# Patient Record
Sex: MALE | Race: WHITE
[De-identification: names, ages, dates, MRNs, and addresses within clinical notes are randomized per-mention and may not be internally consistent; named-entity substitution may affect disease eponyms.]

---

## 2018-06-15 ENCOUNTER — HOSPITAL ENCOUNTER (EMERGENCY)
Dept: HOSPITAL 17 - PHED | Age: 38
Discharge: HOME | End: 2018-06-15
Payer: COMMERCIAL

## 2018-06-15 VITALS
HEART RATE: 77 BPM | SYSTOLIC BLOOD PRESSURE: 144 MMHG | DIASTOLIC BLOOD PRESSURE: 77 MMHG | RESPIRATION RATE: 16 BRPM | OXYGEN SATURATION: 99 % | TEMPERATURE: 98.2 F

## 2018-06-15 DIAGNOSIS — F98.8: ICD-10-CM

## 2018-06-15 DIAGNOSIS — H92.02: ICD-10-CM

## 2018-06-15 DIAGNOSIS — J02.9: Primary | ICD-10-CM

## 2018-06-15 DIAGNOSIS — Z79.899: ICD-10-CM

## 2018-06-15 PROCEDURE — 99283 EMERGENCY DEPT VISIT LOW MDM: CPT

## 2018-06-15 NOTE — PD
HPI


.


Sore throat


Chief Complaint:  ENT Complaint


Time Seen by Provider:  08:28


Travel History


International Travel<30 days:  No


Contact w/Intl Traveler<30days:  No


Traveled to known affect area:  No





History of Present Illness


HPI


Patient presents with a chief complaint of a sore throat and left ear pain.  

Onset yesterday.  Symptoms have been persistent since that time.  Pain is rated 

6/10.  No known fever.  He reports a positive exposure to a respiratory 

infection at work.





Formerly [facility]


Past Medical History


ADD:  Yes


Diminished Hearing:  No





Past Surgical History


Ear Surgery:  Yes ( TUBES AS A CHILD)





Social History


Alcohol Use:  No


Tobacco Use:  Yes (1PPD)


Substance Use:  No





Allergies-Medications


(Allergen,Severity, Reaction):  


Coded Allergies:  


     penicillin G (Verified  Allergy, Severe, anaphalaytic, 6/15/18)


Reported Meds & Prescriptions





Reported Meds & Active Scripts


Active


Keflex (Cephalexin) 500 Mg Capsule 500 Mg PO TID 7 Days


Deltasone 20 Mg Tab (Prednisone) 20 Mg Tab 20 Mg PO BID 5 Days


Benzonatate 200 Mg Cap 200 Mg PO Q8H PRN


Ventolin Hfa (Albuterol Sulfate) 8 Gm Aero 1 Puff INH Q4H PRN


     * SHAKE WELL BEFORE USE *








Review of Systems


Except as stated in HPI:  all other systems reviewed are Neg


General / Constitutional:  No: Fever, Chills


HENT:  Positive: Sore Throat, Earache





Physical Exam


Narrative


GENERAL: Awake and alert and in no acute distress.


SKIN: Warm and dry.  Normal color and turgor.


HEAD: Normocephalic/atraumatic.


EYES: Pupils are equal.  Extraocular movements are intact.


ENT: Oropharynx has erythema and edema of the uvula.  The tonsils are not 

enlarged and there is no exudate.  There is no peritonsillar swelling.  TMs are 

shiny gray with good light reflexes bilaterally.


NECK: Normal range of motion.  Supple.  No cervical lymphadenopathy palpated.


CARDIOVASCULAR: Regular rate and rhythm.


RESPIRATORY: Nonlabored respirations.  Normal sats.


MUSCULOSKELETAL: Atraumatic.  Normal muscle tone.


NEUROLOGICAL: A and O 3.  Nonfocal.


PSYCHIATRIC: Appropriate mood and affect.





Data


Data


Last Documented VS





Vital Signs








  Date Time  Temp Pulse Resp B/P (MAP) Pulse Ox O2 Delivery O2 Flow Rate FiO2


 


6/15/18 08:21 98.2 77 16 144/77 (99) 99   








Orders





 Orders


Ed Discharge Order (6/15/18 08:33)








MDM


Medical Decision Making


Medical Screen Exam Complete:  Yes


Emergency Medical Condition:  Yes


Differential Diagnosis


Differential diagnosis of sore throat includes but is not limited to viral 

illness, strep throat, mononucleosis, retropharyngeal abscess, peritonsillar 

abscess


Narrative Course


This patient presents with a chief complaint of a sore throat.  He has a red 

oropharynx with edema of the uvula.  He will be treated presumptively for strep 

with Keflex.  He is penicillin allergic.





Diagnosis





 Primary Impression:  


 Pharyngitis


 Qualified Codes:  J02.9 - Acute pharyngitis, unspecified


Patient Instructions:  General Instructions


Departure Forms:  Tests/Procedures





***Additional Instructions:  


You may use Chloraseptic spray and throat lozenges as needed.


Warm salt water gargles are often helpful.


You may take Tylenol or ibuprofen for fever and body aches.


Scripts


Cephalexin (Keflex) 500 Mg Capsule


500 MG PO TID for Infection for 7 Days, CAP 0 Refills


   Prov: Madonna Oh MD         6/15/18


Disposition:  01 DISCHARGE HOME


Condition:  Stable











Madonna Oh MD Gualberto 15, 2018 08:36